# Patient Record
Sex: FEMALE | Race: OTHER | NOT HISPANIC OR LATINO
[De-identification: names, ages, dates, MRNs, and addresses within clinical notes are randomized per-mention and may not be internally consistent; named-entity substitution may affect disease eponyms.]

---

## 2017-04-24 ENCOUNTER — APPOINTMENT (OUTPATIENT)
Dept: SURGICAL ONCOLOGY | Facility: CLINIC | Age: 72
End: 2017-04-24

## 2017-08-03 ENCOUNTER — APPOINTMENT (OUTPATIENT)
Dept: DERMATOLOGY | Facility: CLINIC | Age: 72
End: 2017-08-03

## 2018-01-08 ENCOUNTER — RESULT REVIEW (OUTPATIENT)
Age: 73
End: 2018-01-08

## 2022-05-26 ENCOUNTER — APPOINTMENT (OUTPATIENT)
Dept: GASTROENTEROLOGY | Facility: CLINIC | Age: 77
End: 2022-05-26
Payer: MEDICARE

## 2022-05-26 VITALS
DIASTOLIC BLOOD PRESSURE: 75 MMHG | OXYGEN SATURATION: 97 % | TEMPERATURE: 98 F | WEIGHT: 130 LBS | HEART RATE: 65 BPM | SYSTOLIC BLOOD PRESSURE: 120 MMHG | BODY MASS INDEX: 22.2 KG/M2 | RESPIRATION RATE: 16 BRPM | HEIGHT: 64 IN

## 2022-05-26 PROCEDURE — 99213 OFFICE O/P EST LOW 20 MIN: CPT

## 2022-05-26 PROCEDURE — 99203 OFFICE O/P NEW LOW 30 MIN: CPT

## 2022-05-26 NOTE — ASSESSMENT
[FreeTextEntry1] : 77 F thyroid disease, alopecia, dermatitis, gastric GIST here for establishing care.

## 2022-05-26 NOTE — HISTORY OF PRESENT ILLNESS
[de-identified] : 77 F h/o thyroid disease, alopecia, GIST which presented with upper GI bleed in November 2021 at [place], here to establish care at my new office.\par She comes accompanied by her .\par To me she looks thinner than prior but she denies any weight loss.  She states that she is mildly dizzy and this was a similar symptom to before her presentation at the hospital last year.  She had blood work done with her primary care doctor approximately a week ago but has not heard back.  I advised her to please send me the results when they are made available to her and I noted that she might have anemia explaining the dizziness.  She will be investigating the dizziness with an ENT doctor as well.\par Otherwise, she has no other symptoms.  She notes no discomfort with eating.  She does report a longstanding tightness in her chest at meals which her  states is more anxiety than dysphagia.  She notes a normal appetite, dark stool when she was on iron supplementation but subsequently normal, no abdominal pain.  She notes her last colonoscopy was in 2019 and had no findings.  I asked her to please forward the records to my office so we would keep track of this for her next procedure.\par Regarding follow-up after her GIST, she was not given any imatinib but rather told to follow-up with an EGDI in 6 months.  Dr. Lyman (her surgeon) and I spoke about this course of action and we agreed it was the most appropriate.  I still do not have the records from [facility] to confirm the staging of the GIST but asked the patient to release them today.\par

## 2022-06-05 ENCOUNTER — TRANSCRIPTION ENCOUNTER (OUTPATIENT)
Age: 77
End: 2022-06-05

## 2022-06-06 ENCOUNTER — APPOINTMENT (OUTPATIENT)
Dept: GASTROENTEROLOGY | Facility: GI CENTER | Age: 77
End: 2022-06-06
Payer: MEDICARE

## 2022-06-06 ENCOUNTER — RESULT REVIEW (OUTPATIENT)
Age: 77
End: 2022-06-06

## 2022-06-06 ENCOUNTER — OUTPATIENT (OUTPATIENT)
Dept: OUTPATIENT SERVICES | Facility: HOSPITAL | Age: 77
LOS: 1 days | End: 2022-06-06
Payer: MEDICARE

## 2022-06-06 DIAGNOSIS — D21.4 BENIGN NEOPLASM OF CONNECTIVE AND OTHER SOFT TISSUE OF ABDOMEN: ICD-10-CM

## 2022-06-06 PROCEDURE — 88342 IMHCHEM/IMCYTCHM 1ST ANTB: CPT

## 2022-06-06 PROCEDURE — 88305 TISSUE EXAM BY PATHOLOGIST: CPT | Mod: 26

## 2022-06-06 PROCEDURE — 88305 TISSUE EXAM BY PATHOLOGIST: CPT

## 2022-06-06 PROCEDURE — 88342 IMHCHEM/IMCYTCHM 1ST ANTB: CPT | Mod: 26

## 2022-06-06 PROCEDURE — 43239 EGD BIOPSY SINGLE/MULTIPLE: CPT

## 2022-06-06 PROCEDURE — 43202 ESOPHAGOSCOPY FLEX BIOPSY: CPT

## 2022-06-06 NOTE — ASSESSMENT
[FreeTextEntry1] : Stable for EGD\par \par The procedure(s) was (were) discussed in detail with the patient, including indications, alternatives and limitations. \par The risks and benefits were reviewed. \par If applicable, the prep directions were reviewed as well, else the patient was told to fast on the day of the procedure. \par \par

## 2022-06-06 NOTE — HISTORY OF PRESENT ILLNESS
[de-identified] : 77 F GIST 1/22 here for post-resection EGD\par No chemo. \par Doing well, no sx.

## 2022-06-17 LAB — SURGICAL PATHOLOGY STUDY: SIGNIFICANT CHANGE UP

## 2024-02-08 ENCOUNTER — APPOINTMENT (OUTPATIENT)
Dept: ORTHOPEDIC SURGERY | Facility: CLINIC | Age: 79
End: 2024-02-08
Payer: MEDICARE

## 2024-02-08 VITALS
HEART RATE: 70 BPM | WEIGHT: 135 LBS | SYSTOLIC BLOOD PRESSURE: 137 MMHG | BODY MASS INDEX: 24.22 KG/M2 | DIASTOLIC BLOOD PRESSURE: 72 MMHG | HEIGHT: 62.5 IN

## 2024-02-08 DIAGNOSIS — Z78.9 OTHER SPECIFIED HEALTH STATUS: ICD-10-CM

## 2024-02-08 DIAGNOSIS — M65.341 TRIGGER FINGER, RIGHT RING FINGER: ICD-10-CM

## 2024-02-08 PROCEDURE — 20550 NJX 1 TENDON SHEATH/LIGAMENT: CPT | Mod: RT

## 2024-02-08 PROCEDURE — 99203 OFFICE O/P NEW LOW 30 MIN: CPT | Mod: 25

## 2024-02-11 PROBLEM — Z78.9 NON-SMOKER: Status: ACTIVE | Noted: 2024-02-11

## 2024-02-11 NOTE — PHYSICAL EXAM
[de-identified] : Right hand Ring finger A1 pulley is tender With tight composite fist Ring finger locks then triggers into extension with mild discomfort Long finger A1 pulley nontender. No triggering of long finger; no active or provocable. Patient has discomfort at maximum passive composite flexion of long finger at PIP joint PIP 3 flexes 100 degrees DIP 3: 65 degrees There is no other A1 pulley tenderness or triggering in any other finger, right hand. No pertinent MP, PIP, or DIP joint contributory findings, except some Heberden's nodes; none are clinically painful.  Left hand No A1 pulley tenderness and no triggering in any finger. No pertinent MP, PIP, or DIP joint contributory findings, except some Heberden's nodes; none are clinically painful. No additional pertinent positive contributory findings.  Neurologic: Median, ulnar, and radial motor and sensory are intact.  Skin: No cyanosis, clubbing, or rashes. Vascular: Radial pulses intact. Lymphatic: No streaking or epitrochlear adenopathy. The patient is awake, alert, and oriented. Affect appropriate. Cooperative.

## 2024-02-11 NOTE — HISTORY OF PRESENT ILLNESS
[FreeTextEntry1] : Patient is 77 yo RHD female presents as a NEW PATIENT for hand evaluation. The patient c/o right ring trigger finger began approx 3 weeks ago. Patient has had a left trigger thumb and right trigger thumb both treated with cortisone injections. Following the injections the triggering subsided. Patient was treated by Kenji Bauman MD. 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 Pt states that her  was displeased when he was confronted with surgery and ambulatory surgery center, anesthesia and full OR. Consequently, the patient has decided to transfer care and presents today with triggering of right ring finger. Patient denies other trigger fingers. Patient denies numbness or tingling.  No other notable hand complaints.

## 2024-02-11 NOTE — ASSESSMENT
[FreeTextEntry1] : Patient has trigger finger right ring finger that can be painful when it locks but often is not painful.  Patient had right and left thumb trigger fingers successfully treated and resolved following cortisone injections by Kenji Bauman MD. Patient presented here for various reasons seeking treatment by different provider. Patient referred by a friend, Tio Davida. I reviewed treatment options risk and complications.  Cortisone injection for right ring trigger fingers indicated, recommended, and accepted by patient.  Cortisone injection administered without complication or difficulty. The statistical chances of resolution versus recurrence, and the statistics regarding the possibility of additional injections were discussed with the patient. Patient has no other active problem at this time that requires treatment. Prognosis uncertain. A lengthy and detailed discussion was held with the patient regarding analysis, treatment, and recommendations. All questions have been answered. At the conclusion the patient expressed acceptance, understanding and agreement with the plan.

## 2024-02-11 NOTE — CONSULT LETTER
[Dear  ___] : Dear  [unfilled], [Consult Letter:] : I had the pleasure of evaluating your patient, [unfilled]. [FreeTextEntry1] : The patient was seen in hand consultation today. A copy of my office note is enclosed for your review with the patient's knowledge and consent.  Sincerely,  Otis Madrigal MD Chief, Hand Surgery Residency  ([phone]) Department of Orthopaedic Surgery  [facility] Professor of Orthopaedic Surgery Leon CRAWFORD at [facility]

## 2024-07-16 ENCOUNTER — APPOINTMENT (OUTPATIENT)
Dept: ORTHOPEDIC SURGERY | Facility: CLINIC | Age: 79
End: 2024-07-16

## 2024-07-16 VITALS — WEIGHT: 135 LBS | BODY MASS INDEX: 24.22 KG/M2 | HEIGHT: 62.5 IN

## 2024-07-16 DIAGNOSIS — M18.11 UNILATERAL PRIMARY OSTEOARTHRITIS OF FIRST CARPOMETACARPAL JOINT, RIGHT HAND: ICD-10-CM

## 2024-07-16 DIAGNOSIS — M72.0 PALMAR FASCIAL FIBROMATOSIS [DUPUYTREN]: ICD-10-CM

## 2024-07-16 DIAGNOSIS — M65.341 TRIGGER FINGER, RIGHT RING FINGER: ICD-10-CM

## 2024-07-16 PROCEDURE — 20550 NJX 1 TENDON SHEATH/LIGAMENT: CPT | Mod: RT

## 2024-07-16 PROCEDURE — 99214 OFFICE O/P EST MOD 30 MIN: CPT | Mod: 25

## 2024-07-16 RX ORDER — LEVOTHYROXINE SODIUM 0.17 MG/1
TABLET ORAL
Refills: 0 | Status: ACTIVE | COMMUNITY

## 2024-07-16 RX ORDER — ANASTROZOLE TABLETS 1 MG/1
TABLET ORAL
Refills: 0 | Status: ACTIVE | COMMUNITY

## 2025-01-30 ENCOUNTER — NON-APPOINTMENT (OUTPATIENT)
Age: 80
End: 2025-01-30

## 2025-01-30 ENCOUNTER — APPOINTMENT (OUTPATIENT)
Dept: ORTHOPEDIC SURGERY | Facility: CLINIC | Age: 80
End: 2025-01-30

## 2025-01-30 VITALS — WEIGHT: 134.8 LBS | HEIGHT: 62.5 IN | BODY MASS INDEX: 24.18 KG/M2

## 2025-01-30 DIAGNOSIS — M18.11 UNILATERAL PRIMARY OSTEOARTHRITIS OF FIRST CARPOMETACARPAL JOINT, RIGHT HAND: ICD-10-CM

## 2025-01-30 DIAGNOSIS — M65.331 TRIGGER FINGER, RIGHT MIDDLE FINGER: ICD-10-CM

## 2025-01-30 DIAGNOSIS — M72.0 PALMAR FASCIAL FIBROMATOSIS [DUPUYTREN]: ICD-10-CM

## 2025-01-30 DIAGNOSIS — M65.341 TRIGGER FINGER, RIGHT RING FINGER: ICD-10-CM

## 2025-01-30 PROCEDURE — 99214 OFFICE O/P EST MOD 30 MIN: CPT | Mod: 25

## 2025-01-30 PROCEDURE — 20550 NJX 1 TENDON SHEATH/LIGAMENT: CPT | Mod: RT

## 2025-04-03 ENCOUNTER — APPOINTMENT (OUTPATIENT)
Dept: OPHTHALMOLOGY | Facility: CLINIC | Age: 80
End: 2025-04-03

## (undated) DEVICE — FORCEP RADIAL JAW 4 W NDL 2.4MM 2.8MM 240CM ORANGE DISP

## (undated) DEVICE — VALVE ENDOSCOPE DEFENDO SINGLE USE